# Patient Record
Sex: FEMALE | Race: WHITE | NOT HISPANIC OR LATINO | Employment: FULL TIME | URBAN - METROPOLITAN AREA
[De-identification: names, ages, dates, MRNs, and addresses within clinical notes are randomized per-mention and may not be internally consistent; named-entity substitution may affect disease eponyms.]

---

## 2018-08-30 ENCOUNTER — OFFICE VISIT (OUTPATIENT)
Dept: URGENT CARE | Facility: CLINIC | Age: 63
End: 2018-08-30
Payer: COMMERCIAL

## 2018-08-30 VITALS
HEART RATE: 79 BPM | DIASTOLIC BLOOD PRESSURE: 92 MMHG | OXYGEN SATURATION: 98 % | SYSTOLIC BLOOD PRESSURE: 138 MMHG | BODY MASS INDEX: 37.33 KG/M2 | HEIGHT: 69 IN | TEMPERATURE: 98.8 F | RESPIRATION RATE: 16 BRPM | WEIGHT: 252 LBS

## 2018-08-30 DIAGNOSIS — Z02.1 PRE-EMPLOYMENT EXAMINATION: Primary | ICD-10-CM

## 2018-08-30 NOTE — PROGRESS NOTES
[facility]        NAME: Kim Mayfield is a 66 y o  female  : 1955    MRN: [id number]  DATE: 2018  TIME: 2:06 PM    Assessment and Plan   Pre-employment examination [Z02 1]  1  Pre-employment examination           Patient Instructions     Forms filled out for patient  Chief Complaint     Chief Complaint   Patient presents with    Employment Physical     pt is here for pre-employment basic physical         History of Present Illness       65 y/o female presents for pre employment physical  PMH + for hypothyroid  In chart, there is note of a cardiac murmur dx  Pt states she had cardiac evaluation done after someone heard a murmur on one physical exam  She states her ECHO was normal  No murmur appreciated today  Review of Systems   Review of Systems   Constitutional: Negative for chills, fatigue and fever  HENT: Negative for congestion, ear pain, sinus pain, sneezing and sore throat  Respiratory: Negative for cough, shortness of breath and wheezing  Cardiovascular: Negative for chest pain  Gastrointestinal: Negative for abdominal pain, nausea and vomiting  Neurological: Negative for headaches  All other systems reviewed and are negative  Current Medications       Current Outpatient Prescriptions:     levothyroxine (SYNTHROID) 88 mcg tablet, Take 88 mcg by mouth daily  , Disp: , Rfl:     Current Allergies     Allergies as of 2018    (No Known Allergies)            The following portions of the patient's history were reviewed and updated as appropriate: allergies, current medications, past family history, past medical history, past social history, past surgical history and problem list    Past Medical History:   Diagnosis Date    Disease of thyroid gland      Past Surgical History:   Procedure Laterality Date    HYSTERECTOMY      TUBAL LIGATION             Objective   /92   Pulse 79   Temp 98 8 °F (37 1 °C)   Resp 16   Ht 5' 9" (1 753 m) Wt 114 kg (252 lb)   SpO2 98%   BMI 37 21 kg/m²        Physical Exam     Physical Exam   Constitutional: She is oriented to person, place, and time  She appears well-developed and well-nourished  HENT:   Head: Normocephalic  Right Ear: External ear normal    Left Ear: External ear normal    Nose: Nose normal    Mouth/Throat: Oropharynx is clear and moist    Eyes: EOM are normal  Pupils are equal, round, and reactive to light  Neck: Normal range of motion  Neck supple  Cardiovascular: Normal rate, regular rhythm, normal heart sounds and intact distal pulses  Pulmonary/Chest: Effort normal and breath sounds normal    Abdominal: Soft  Bowel sounds are normal    Musculoskeletal: Normal range of motion  Neurological: She is alert and oriented to person, place, and time  She has normal reflexes  Skin: Skin is warm and dry  Psychiatric: She has a normal mood and affect  Her behavior is normal  Judgment and thought content normal    Nursing note and vitals reviewed